# Patient Record
Sex: FEMALE | URBAN - METROPOLITAN AREA
[De-identification: names, ages, dates, MRNs, and addresses within clinical notes are randomized per-mention and may not be internally consistent; named-entity substitution may affect disease eponyms.]

---

## 2023-08-28 ENCOUNTER — OFFICE VISIT (OUTPATIENT)
Dept: OBGYN CLINIC | Age: 23
End: 2023-08-28

## 2023-08-28 VITALS
WEIGHT: 146 LBS | HEIGHT: 67 IN | SYSTOLIC BLOOD PRESSURE: 114 MMHG | BODY MASS INDEX: 22.91 KG/M2 | DIASTOLIC BLOOD PRESSURE: 72 MMHG

## 2023-08-28 DIAGNOSIS — Z12.4 PAP SMEAR FOR CERVICAL CANCER SCREENING: Primary | ICD-10-CM

## 2023-08-28 DIAGNOSIS — Z01.419 WOMEN'S ANNUAL ROUTINE GYNECOLOGICAL EXAMINATION: ICD-10-CM

## 2023-08-28 DIAGNOSIS — N89.8 VAGINAL DISCHARGE: ICD-10-CM

## 2023-08-28 DIAGNOSIS — Z11.3 SCREEN FOR STD (SEXUALLY TRANSMITTED DISEASE): ICD-10-CM

## 2023-08-28 PROCEDURE — 99385 PREV VISIT NEW AGE 18-39: CPT | Performed by: NURSE PRACTITIONER

## 2023-08-28 RX ORDER — NORETHINDRONE ACETATE AND ETHINYL ESTRADIOL 1MG-20(21)
1 KIT ORAL DAILY
Qty: 3 PACKET | Refills: 4 | Status: SHIPPED | OUTPATIENT
Start: 2023-08-28

## 2023-08-28 SDOH — ECONOMIC STABILITY: INCOME INSECURITY: HOW HARD IS IT FOR YOU TO PAY FOR THE VERY BASICS LIKE FOOD, HOUSING, MEDICAL CARE, AND HEATING?: NOT HARD AT ALL

## 2023-08-28 SDOH — ECONOMIC STABILITY: FOOD INSECURITY: WITHIN THE PAST 12 MONTHS, YOU WORRIED THAT YOUR FOOD WOULD RUN OUT BEFORE YOU GOT MONEY TO BUY MORE.: NEVER TRUE

## 2023-08-28 SDOH — ECONOMIC STABILITY: FOOD INSECURITY: WITHIN THE PAST 12 MONTHS, THE FOOD YOU BOUGHT JUST DIDN'T LAST AND YOU DIDN'T HAVE MONEY TO GET MORE.: NEVER TRUE

## 2023-08-28 SDOH — ECONOMIC STABILITY: HOUSING INSECURITY
IN THE LAST 12 MONTHS, WAS THERE A TIME WHEN YOU DID NOT HAVE A STEADY PLACE TO SLEEP OR SLEPT IN A SHELTER (INCLUDING NOW)?: NO

## 2023-08-28 ASSESSMENT — PATIENT HEALTH QUESTIONNAIRE - PHQ9
2. FEELING DOWN, DEPRESSED OR HOPELESS: 0
SUM OF ALL RESPONSES TO PHQ QUESTIONS 1-9: 0
SUM OF ALL RESPONSES TO PHQ9 QUESTIONS 1 & 2: 0
1. LITTLE INTEREST OR PLEASURE IN DOING THINGS: 0
SUM OF ALL RESPONSES TO PHQ QUESTIONS 1-9: 0

## 2023-08-28 NOTE — PROGRESS NOTES
Patient comes in today as a New Patient for Annual Exam. Patient would like to discuss birth control. Patient states she would like to show some pictures of vaginal discharge. Patient states she has a vaginal discharge that is thick slim and a lot. Patient denies any odor or itching. Patient states she has the discharge for the last month. : Demetria Marley IC:[id number]    LAST PAP:  2 years ago in     LAST MAMMO:  Never    LMP:  Patient's last menstrual period was 08/18/2023 (exact date).     BIRTH CONTROL:  none    TOBACCO USE:  No    FAMILY HISTORY OF:   Breast Cancer:  No   Ovarian Cancer:  No   Uterine Cancer:  No   Colon Cancer:  No    Vitals:    08/28/23 1009   BP: 114/72   Site: Left Upper Arm   Position: Sitting   Weight: 146 lb (66.2 kg)   Height: 5' 7\" (1.702 m)        Yani Nuno MA  08/28/23  10:21 AM

## 2023-08-28 NOTE — PROGRESS NOTES
I have reviewed the patient's visit today including history, exam and assessment by CARIE Madera. I agree with treatment/plan as above.     Barbera Schlatter, MD  12:17 PM  08/28/23

## 2023-08-28 NOTE — PROGRESS NOTES
Jovany Lisa is a 26 y.o. Genita Loosen who is here for annual exam        Patient's last menstrual period was 08/18/2023 (exact date). Menses:Q month, lasting 4-5 days    Menstrual Complaints: none    Birth Control:none    Last Pap:2 years ago    Hx abnormal pap or STD:denies    Last Mammo: n/a    Fam Hx of Breast, ovarian or uterine cancer:denies    Sexually Active:yes    Number of partners in the last year:1          ROS:    Breast: Denies pain, lump or nipple discharge    GYN: Denies pelvic pain, itching, odor or dysuria. + vaginal discharge. Not currently sexually active as partner is in the D.R. denies recent change to hygiene products. Constitutional: Negative for chills and fever. HENT: Negative for severe headaches or vision changes    Respiratory: Negative for cough and shortness of breath. Cardiovascular: Negative for chest pain and palpitations. Gastrointestinal: Negative for nausea and vomiting. Negative for diarrhea and constipation    Genitourinary: Negative for dysuria and hematuria. Musculoskeletal: Negative for back pain    Skin: Negative for rash and wound. Psych: No depression or anxiety          History reviewed. No pertinent past medical history. History reviewed. No pertinent surgical history.     Family History   Problem Relation Age of Onset    Breast Cancer Neg Hx     Colon Cancer Neg Hx     Ovarian Cancer Neg Hx     Uterine Cancer Neg Hx        Social History     Socioeconomic History    Marital status: Unknown     Spouse name: Not on file    Number of children: Not on file    Years of education: Not on file    Highest education level: Not on file   Occupational History    Not on file   Tobacco Use    Smoking status: Never    Smokeless tobacco: Never   Substance and Sexual Activity    Alcohol use: Never    Drug use: Never    Sexual activity: Yes     Partners: Male     Birth control/protection: None   Other Topics Concern    Not on file   Social History Narrative

## 2023-08-28 NOTE — ASSESSMENT & PLAN NOTE
Pap today + nuswab (c/o vaginal discharge)  Declines serum std labs  mammo n/a    D/W pt at length multiple options, risks, benefits, SE's about OCP's, Nuvaring, Patch, Nexplanon, Depo and IUD including but not limited to H/A, N/V, thromboembolic events, bleeding patterns, weight gain and efficacy. Also, reminded pt that these methods do NOT protect against STD's - condoms are encouraged. Aches s/s reviewed  Patient denies h/o DVT, stroke, MI, migraines, liver disease or HTN    rx sent for OCPs.  Pt advised to start with next menses  RTO 1 year

## 2023-08-30 LAB
A VAGINAE DNA VAG QL NAA+PROBE: ABNORMAL SCORE
BVAB2 DNA VAG QL NAA+PROBE: ABNORMAL SCORE
C ALBICANS DNA VAG QL NAA+PROBE: NEGATIVE
C GLABRATA DNA VAG QL NAA+PROBE: POSITIVE
MEGA1 DNA VAG QL NAA+PROBE: ABNORMAL SCORE
SPECIMEN SOURCE: ABNORMAL
T VAGINALIS RRNA SPEC QL NAA+PROBE: NEGATIVE

## 2023-08-31 DIAGNOSIS — B37.9 YEAST INFECTION: Primary | ICD-10-CM

## 2023-08-31 LAB
C TRACH RRNA CVX QL NAA+PROBE: NEGATIVE
CYTOLOGIST CVX/VAG CYTO: NORMAL
CYTOLOGY CVX/VAG DOC THIN PREP: NORMAL
HPV REFLEX: NORMAL
Lab: NORMAL
N GONORRHOEA RRNA CVX QL NAA+PROBE: NEGATIVE
PATH REPORT.FINAL DX SPEC: NORMAL
STAT OF ADQ CVX/VAG CYTO-IMP: NORMAL
T VAGINALIS RRNA SPEC QL NAA+PROBE: NEGATIVE

## 2023-08-31 NOTE — TELEPHONE ENCOUNTER
:   Lucretia Cruz   #[id number]    Called patient, tried the numerous phone numbers left on previous messages left on the nurses line. No answers on any of the phone numbers listed/given. LVM on x2 numbers that were set up for a voicemail.

## 2023-09-06 NOTE — TELEPHONE ENCOUNTER
Called pt, pt answer and requested  to give her the results. : Renee Heck    Called pt again w/ , message below reviewed with pt, pt asked for us to send in her [facility] as well. Informed pt we have already sent that in. Pt voiced understanding and requested Terazol.      RX pend